# Patient Record
Sex: MALE | Race: WHITE
[De-identification: names, ages, dates, MRNs, and addresses within clinical notes are randomized per-mention and may not be internally consistent; named-entity substitution may affect disease eponyms.]

---

## 2021-01-27 ENCOUNTER — HOSPITAL ENCOUNTER (EMERGENCY)
Dept: HOSPITAL 47 - EC | Age: 31
Discharge: HOME | End: 2021-01-27
Payer: COMMERCIAL

## 2021-01-27 VITALS
HEART RATE: 90 BPM | SYSTOLIC BLOOD PRESSURE: 131 MMHG | TEMPERATURE: 99 F | DIASTOLIC BLOOD PRESSURE: 67 MMHG | RESPIRATION RATE: 16 BRPM

## 2021-01-27 DIAGNOSIS — L02.01: Primary | ICD-10-CM

## 2021-01-27 DIAGNOSIS — F17.200: ICD-10-CM

## 2021-01-27 PROCEDURE — 99283 EMERGENCY DEPT VISIT LOW MDM: CPT

## 2021-01-27 PROCEDURE — 10060 I&D ABSCESS SIMPLE/SINGLE: CPT

## 2021-01-27 NOTE — ED
General Adult HPI





- General


Chief complaint: Skin/Abscess/Foreign Body


Stated complaint: abscess on face


Time Seen by Provider: 01/27/21 16:43


Source: patient, RN notes reviewed


Mode of arrival: ambulatory


Limitations: no limitations





- History of Present Illness


Initial comments: 





30-year-old male presents emergency Department chief complaint of abscess to his

right cheek.  Patient states has been there for over a week.  Patient is placed 

on Bactrim at [facility] on Sunday.  Patient states that has not improved.  No

fevers or chills patient states it is painful.





- Related Data


                                  Previous Rx's











 Medication  Instructions  Recorded


 


Cephalexin [Keflex] 500 mg PO Q6HR #40 cap 01/27/21











                                    Allergies











Allergy/AdvReac Type Severity Reaction Status Date / Time


 


No Known Allergies Allergy   Verified 01/27/21 16:40














Review of Systems


ROS Statement: 


Those systems with pertinent positive or pertinent negative responses have been 

documented in the HPI.





ROS Other: All systems not noted in ROS Statement are negative.





Past Medical History


Additional Past Medical History / Comment(s): Hep C


History of Any Multi-Drug Resistant Organisms: MRSA


Date of last positivie culture/infection: 2018


MDRO Source:: (L) ankle


Past Surgical History: No Surgical Hx Reported


Past Psychological History: ADD/ADHD, Bipolar


Smoking Status: Current every day smoker


Past Alcohol Use History: None Reported


Past Drug Use History: Heroin, Methamphetamine





General Exam


Limitations: no limitations


General appearance: alert, in no apparent distress


Head exam: Present: atraumatic, normocephalic, normal inspection


ENT exam: Present: normal exam, normal oropharynx, mucous membranes moist.  

Absent: other (2 x 3 cm abscess right cheek)


Neck exam: Present: normal inspection, full ROM.  Absent: tenderness, 

meningismus, lymphadenopathy


Respiratory exam: Present: normal lung sounds bilaterally.  Absent: respiratory 

distress, wheezes, rales, rhonchi, stridor


Cardiovascular Exam: Present: regular rate, normal rhythm, normal heart sounds. 

 Absent: systolic murmur, diastolic murmur, rubs, gallop, clicks





Course


                                   Vital Signs











  01/27/21





  16:37


 


Temperature 99.0 F


 


Pulse Rate 90


 


Respiratory 16





Rate 


 


Blood Pressure 131/67


 


O2 Sat by Pulse 100





Oximetry 














Procedures





- Incision & Drainage


Consent Obtained: written consent


Site: face


Size (cm): 3


Anesthetic Used: lidocaine 1%, without epi


Amount (mLs): 3


I&D Cleaning Method: Alcohol Wipe


Sterile Field Used?: No


Needle Aspiration Performed?: Yes


I&D Drainage Obtained: Pus, Blood


Culture Obtained?: No


Patient Tolerated Procedure: well, no complications





Medical Decision Making





- Medical Decision Making





Patient has a very firm nonfluctuant Abscess in the Face I&D Was Attempted with 

18-gauge Needle There Is a Small Amount of Thick Discharge Patient's Will 

Continue Bactrim Will Be Given Keflex and Return Parameters Were Discussed.  

Patient Will Start Using Warm Compresses over the Area.





Disposition


Clinical Impression: 


 Abscess of right external cheek





Disposition: HOME SELF-CARE


Condition: Stable


Instructions (If sedation given, give patient instructions):  Abscess Incision 

and Drainage (ED)


Additional Instructions: 


Please return to the Emergency Department if symptoms worsen or any other 

concerns.


Prescriptions: 


Cephalexin [Keflex] 500 mg PO Q6HR #40 cap


Is patient prescribed a controlled substance at d/c from ED?: No


Referrals: 


None,Stated [Primary Care Provider] - 1-2 days